# Patient Record
Sex: FEMALE | Race: WHITE
[De-identification: names, ages, dates, MRNs, and addresses within clinical notes are randomized per-mention and may not be internally consistent; named-entity substitution may affect disease eponyms.]

---

## 2019-01-11 ENCOUNTER — HOSPITAL ENCOUNTER (OUTPATIENT)
Dept: HOSPITAL 92 - BICMRI | Age: 29
Discharge: HOME | End: 2019-01-11
Payer: COMMERCIAL

## 2019-01-11 DIAGNOSIS — M65.9: ICD-10-CM

## 2019-01-11 DIAGNOSIS — M25.561: Primary | ICD-10-CM

## 2019-01-11 NOTE — MRI
MRI RIGHT KNEE WITHOUT CONTRAST:

 

HISTORY: 

M25.561, knee pain.

 

COMPARISON: 

None.

 

FINDINGS: 

 

Medial Meniscus:

Intact.

 

Lateral Meniscus:

Intact.

 

ACL, PCL, MCL, and LCL are intact.

 

Extensor Mechanism:

The quadriceps tendon, patella, and patella tendon are all intact. 

 

Cartilage:

 

Patellofemoral Compartment:

Intact.

 

Medial Compartment:

Intact.

 

Lateral Compartment:

Intact.

 

Soft Tissues:

There is a small joint effusion.  There is a moderate popliteus bursa effusion.

 

There is mild synovitis in the popliteus bursa.

 

Bones:

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  There are erosions along 
the subcortical surface of the proximal fibula.

 

IMPRESSION: 

1.  No acute internal derangement.

 

2.  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

 

3.  Mild synovitis within a distended popliteus bursa effusion.

 

POS: [facility]

## 2019-08-13 ENCOUNTER — HOSPITAL ENCOUNTER (OUTPATIENT)
Dept: HOSPITAL 92 - SDC | Age: 29
Discharge: HOME | End: 2019-08-13
Attending: SURGERY
Payer: COMMERCIAL

## 2019-08-13 VITALS — BODY MASS INDEX: 23.7 KG/M2

## 2019-08-13 DIAGNOSIS — Z91.040: ICD-10-CM

## 2019-08-13 DIAGNOSIS — A63.0: Primary | ICD-10-CM

## 2019-08-13 PROCEDURE — 0D5QXZZ DESTRUCTION OF ANUS, EXTERNAL APPROACH: ICD-10-PCS | Performed by: SURGERY

## 2019-08-13 NOTE — OP
DATE OF PROCEDURE:  08/13/2019



PREOPERATIVE DIAGNOSIS:  Anal condyloma.



POSTOPERATIVE DIAGNOSIS:  Anal condyloma.



PROCEDURE PERFORMED:  Fulguration of anal condyloma, moderate amount.



ANESTHESIA:  General.



ESTIMATED BLOOD LOSS:  Minimal.



COMPLICATIONS:  None.



FINDINGS:  No obvious mass.



DESCRIPTION OF PROCEDURE:  The patient was taken to the operating room and laid

supine on the operating room table.  After general anesthetic was obtained, she was

placed in lithotomy position.  Her perineal area was prepped and draped in a sterile

fashion.  Vaginal exam revealed no obvious vaginal canal or external labia

condyloma.  There are multiple on the external surface of the anus and into the anal

canal.  These were all burned using cautery.  The condylomas then slough off.  The

bases were then re-cauterized.  All obvious gross condyloma tissue was burned and

removed.  The patient was en route to recovery in stable condition.  All instrument

counts, needle counts, and lap counts were correct. 







Job ID:  [id number]

## 2019-09-30 ENCOUNTER — HOSPITAL ENCOUNTER (OUTPATIENT)
Dept: HOSPITAL 92 - SDC | Age: 29
Discharge: HOME | End: 2019-09-30
Attending: SURGERY
Payer: COMMERCIAL

## 2019-09-30 VITALS — BODY MASS INDEX: 23 KG/M2

## 2019-09-30 DIAGNOSIS — M19.90: ICD-10-CM

## 2019-09-30 DIAGNOSIS — H54.8: ICD-10-CM

## 2019-09-30 DIAGNOSIS — K81.1: Primary | ICD-10-CM

## 2019-09-30 DIAGNOSIS — N17.9: ICD-10-CM

## 2019-09-30 DIAGNOSIS — Z91.040: ICD-10-CM

## 2019-09-30 DIAGNOSIS — Z79.899: ICD-10-CM

## 2019-09-30 DIAGNOSIS — K82.8: ICD-10-CM

## 2019-09-30 PROCEDURE — 0FT44ZZ RESECTION OF GALLBLADDER, PERCUTANEOUS ENDOSCOPIC APPROACH: ICD-10-PCS | Performed by: SURGERY

## 2019-09-30 PROCEDURE — 88304 TISSUE EXAM BY PATHOLOGIST: CPT

## 2019-10-01 NOTE — OP
DATE OF PROCEDURE:  09/30/2019



PREOPERATIVE DIAGNOSIS:  Chronic biliary dyskinesia.



POSTOPERATIVE DIAGNOSIS:  Chronic biliary dyskinesia.



PROCEDURE PERFORMED:  Laparoscopic cholecystectomy.



ANESTHESIA:  General.



ESTIMATED BLOOD LOSS:  Minimal.



COMPLICATIONS:  None.



SPECIMEN:  Gallbladder.



FINDINGS:  Chronic cholecystitis.



PROCEDURE IN DETAIL:  The patient was taken to the operating room and laid supine on

the operating room table.  After general anesthetic was obtained, the abdomen was

prepped and draped in a sterile fashion.  A curved incision was made below the

umbilicus.  Cautery was used to dissect down to the umbilical fascia.  Umbilical

fascia was incised and held up using a Kocher.  The abdominal cavity was entered

using a Chelsea clamp.  Holding stitch of Vicryl was placed on each side of the

fascia.  Rodriguez trocar was placed.  High-flow pneumoperitoneum was obtained.  An

upper midline 5 mm port and 2 right upper quadrant 5 mm ports were placed under

direct camera visualization.  The gallbladder was retracted from the gallbladder

fossa.  The peritoneum of the gallbladder was opened anteriorly and posteriorly.

The critical view triangle was seen showing only the cystic duct and cystic artery

branching from medial to lateral.  There were no other branching structures.  Two

clips were placed proximally on the cystic duct and one laterally.  It was cut using

laparoscopic scissors.  The cystic artery was taken in the same way.  Electrocautery

was then used to dissect the gallbladder out of the gallbladder fossa.  The

gallbladder was placed in an Endo catch bag and brought out through the Rodriguez.

There was no bleeding or bile in the liver bed.  The cystic duct stump and cystic

artery stump were intact, without evidence of extravasation or bleeding.  All port

sites were infiltrated using local anesthesia.  All ports were removed under camera

visualization.  Pneumoperitoneum was let down.  The Vicryl was used to close the

fascial defect below the umbilicus.  All incisions were irrigated and closed using

4-0 Monocryl and Dermabond.   The patient was en route to Recovery in stable

condition.  All instrument counts, needle counts and lap counts were correct. 







Job ID:  [id number]